# Patient Record
Sex: FEMALE | Race: WHITE | NOT HISPANIC OR LATINO | URBAN - METROPOLITAN AREA
[De-identification: names, ages, dates, MRNs, and addresses within clinical notes are randomized per-mention and may not be internally consistent; named-entity substitution may affect disease eponyms.]

---

## 2017-04-26 NOTE — PATIENT DISCUSSION
Schedule Surgery Counseling: I have discussed the option of scheduling surgery versus following, as well as the risks, benefits and alternatives of cataract surgery with the patient. It was explained that the surgery is medically indicated at this time, and it can be performed at the patient's option as delaying will cause no further deterioration, therefore there is no rush and there is no harm in waiting to have surgery. It was also explained that there is no guarantee that removing the cataract will improve their vision. The patient understands and desires to schedule preop followed by cataract surgery.

## 2017-04-26 NOTE — PATIENT DISCUSSION
Surgery Counseling: I have discussed the option of scheduling surgery versus following, as well as the risks, benefits and alternatives of cataract surgery with the patient. It was explained that the surgery is medically indicated at this time, and it can be performed at the patient's option as delaying will cause no further deterioration, therefore there is no rush and there is no harm in waiting to have surgery. It was also explained that there is no guarantee that removing the cataract will improve their vision. The patient understands and desires to proceed with cataract surgery with the implantation of an intraocular lens to improve vision for __DRIVING____. I have given the patient the prescribed regimen of the all-in-one drop to use before and after cataract surgery. They have elected to use the all-in-one option of Pred/Gati/Nepaf(prednisolone acetate,gatifloxacin,and nepafenac. Patient to administer as directed.

## 2017-05-02 NOTE — PATIENT DISCUSSION
CATARACTS, OU - VISUALLY SIGNIFICANT. SCHEDULE OD__ FIRST THEN LATER IN _OS_ DISCUSSED OPTION OF _STD WITH MANUAL LRI _______________VS _____STD_____________. PATIENT UNDERSTANDS AND DESIRES ______________STD DUE TO COST_______________.

## 2017-05-02 NOTE — PATIENT DISCUSSION
Surgery Counseling: I have discussed the option of scheduling surgery versus following, as well as the risks, benefits and alternatives of cataract surgery with the patient. It was explained that the surgery is medically indicated at this time, and it can be performed at the patient's option as delaying will cause no further deterioration, therefore there is no rush and there is no harm in waiting to have surgery. It was also explained that there is no guarantee that removing the cataract will improve their vision. The patient understands and desires to proceed with cataract surgery with the implantation of an intraocular lens to improve vision for driving at night andf glare__. I have given the patient the prescribed regimen of the all-in-one drop to use before and after cataract surgery. They have elected to use the all-in-one option of Pred/Gati/Nepaf(prednisolone acetate,gatifloxacin,and nepafenac. Patient to administer as directed.

## 2017-06-02 NOTE — PATIENT DISCUSSION
Post-Op Instructions: The patient was instructed in the proper use of post-operative eye drops: Ofloxacin in the surgical eye 4 times per day for 1 week, then discontinue . Continue Prolensa(or approved generic alternative Bromfenac 0.09%) once daily in the surgical eye and  Prednisolone 4 times per day until instructed further on next visit. Call back instructions, retinal detachment and endophthalmitis precautions given.

## 2017-06-02 NOTE — PATIENT DISCUSSION
Continue: Pred Forte (prednisolone acetate): drops,suspension: 1% 1 drop four times a day as directed into right eye 05-

## 2017-07-17 NOTE — PATIENT DISCUSSION
ACUTE IRITIS,  OS: PRESCRIBE PRED Q2 H FOR ONE DAY THEN QID THEREAFTER. 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 FOLLOW-UP AS SCHEDULED.

## 2017-07-17 NOTE — PATIENT DISCUSSION
Acute Iritis Counseling: I have explained the diagnosis and pathophysiology acute iritis. In isolated circumstances this is usually idiopathic, however recurrent intraocular inflammation may be associated with systemic disease and medical and laboratory workup may be necessary in conjunction with the patient&rsquo;s primary care doctor. I have stressed compliance with drops and follow-up.

## 2017-07-19 NOTE — PATIENT DISCUSSION
ACUTE IRITIS,  OS, IMPROVING: TAPER PRED QID X 4 DAYS, TID THEREAFTER. 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 FOLLOW-UP AS SCHEDULED.

## 2017-07-19 NOTE — PATIENT DISCUSSION
Continue: Pred Forte (prednisolone acetate): drops,suspension: 1% 1 drop four times a day into left eye 07-

## 2017-07-24 NOTE — PATIENT DISCUSSION
ACUTE IRITIS, OS, IMPROVING: TAPER PRED TID X 3 DAYS, BID X 3 DAYS, QD X 2 DAYS. 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 FOLLOW-UP AS SCHEDULED.

## 2018-08-14 ENCOUNTER — ESTABLISHED COMPREHENSIVE EXAM (OUTPATIENT)
Dept: URBAN - METROPOLITAN AREA CLINIC 39 | Facility: CLINIC | Age: 58
End: 2018-08-14

## 2018-08-14 DIAGNOSIS — H25.093: ICD-10-CM

## 2018-08-14 DIAGNOSIS — H04.123: ICD-10-CM

## 2018-08-14 PROCEDURE — 92015 DETERMINE REFRACTIVE STATE: CPT

## 2018-08-14 PROCEDURE — 92014 COMPRE OPH EXAM EST PT 1/>: CPT

## 2018-08-14 ASSESSMENT — TONOMETRY
OD_IOP_MMHG: 15
OS_IOP_MMHG: 15

## 2018-08-14 ASSESSMENT — VISUAL ACUITY
OS_SC: J2
OD_SC: 20/25-1
OS_SC: 20/40+2
OD_SC: J10

## 2020-07-16 ENCOUNTER — EST. PATIENT EMERGENCY (OUTPATIENT)
Dept: URBAN - METROPOLITAN AREA CLINIC 39 | Facility: CLINIC | Age: 60
End: 2020-07-16

## 2020-07-16 DIAGNOSIS — H04.123: ICD-10-CM

## 2020-07-16 DIAGNOSIS — H25.093: ICD-10-CM

## 2020-07-16 DIAGNOSIS — G43.B0: ICD-10-CM

## 2020-07-16 PROCEDURE — 92012 INTRM OPH EXAM EST PATIENT: CPT

## 2020-07-16 ASSESSMENT — VISUAL ACUITY
OS_SC: 20/30-1
OD_SC: 20/25+2

## 2020-09-14 NOTE — PATIENT DISCUSSION
CONJUNCTIVITIS (ALLERGIC), OU:  PRESCRIBE ZADITOR BID OU. RETURN FOR FOLLOW-UP AS SCHEDULED. Routing to provider for review.    Zully Echeverria, RN, BSN, PHN  [facility]

## 2020-10-14 ENCOUNTER — ESTABLISHED COMPREHENSIVE EXAM (OUTPATIENT)
Dept: URBAN - METROPOLITAN AREA CLINIC 39 | Facility: CLINIC | Age: 60
End: 2020-10-14

## 2020-10-14 DIAGNOSIS — H04.123: ICD-10-CM

## 2020-10-14 DIAGNOSIS — H25.093: ICD-10-CM

## 2020-10-14 DIAGNOSIS — G43.B0: ICD-10-CM

## 2020-10-14 PROCEDURE — 92014 COMPRE OPH EXAM EST PT 1/>: CPT

## 2020-10-14 PROCEDURE — 92015 DETERMINE REFRACTIVE STATE: CPT

## 2020-10-14 ASSESSMENT — KERATOMETRY
OD_K2POWER_DIOPTERS: 44.25
OS_K1POWER_DIOPTERS: 43.25
OD_AXISANGLE2_DEGREES: 105
OD_AXISANGLE_DEGREES: 15
OS_AXISANGLE2_DEGREES: 100
OD_K1POWER_DIOPTERS: 43.5
OS_K2POWER_DIOPTERS: 44
OS_AXISANGLE_DEGREES: 10

## 2020-10-14 ASSESSMENT — VISUAL ACUITY
OU_SC: J3
OS_SC: J3
OU_CC: J1+
OD_CC: J1
OD_SC: 20/25
OS_SC: 20/20
OD_SC: J5-
OU_SC: 20/20
OS_CC: J1+

## 2020-10-14 ASSESSMENT — TONOMETRY
OD_IOP_MMHG: 15
OS_IOP_MMHG: 13

## 2021-03-12 ENCOUNTER — EST. PATIENT EMERGENCY (OUTPATIENT)
Dept: URBAN - METROPOLITAN AREA CLINIC 39 | Facility: CLINIC | Age: 61
End: 2021-03-12

## 2021-03-12 DIAGNOSIS — H04.123: ICD-10-CM

## 2021-03-12 DIAGNOSIS — H43.812: ICD-10-CM

## 2021-03-12 DIAGNOSIS — H25.093: ICD-10-CM

## 2021-03-12 PROCEDURE — 92012 INTRM OPH EXAM EST PATIENT: CPT

## 2021-03-12 ASSESSMENT — VISUAL ACUITY
OS_SC: 20/25
OU_SC: 20/20-1
OD_SC: 20/25-1

## 2021-03-12 ASSESSMENT — TONOMETRY
OS_IOP_MMHG: 15
OD_IOP_MMHG: 14

## 2021-03-12 ASSESSMENT — KERATOMETRY
OD_AXISANGLE_DEGREES: 15
OD_K2POWER_DIOPTERS: 44.25
OS_K1POWER_DIOPTERS: 43.25
OD_AXISANGLE2_DEGREES: 105
OS_AXISANGLE2_DEGREES: 100
OS_K2POWER_DIOPTERS: 44
OD_K1POWER_DIOPTERS: 43.5
OS_AXISANGLE_DEGREES: 10

## 2021-04-16 ENCOUNTER — DILATED FUNDUS EXAM (OUTPATIENT)
Dept: URBAN - METROPOLITAN AREA CLINIC 39 | Facility: CLINIC | Age: 61
End: 2021-04-16

## 2021-04-16 DIAGNOSIS — H04.123: ICD-10-CM

## 2021-04-16 DIAGNOSIS — H43.812: ICD-10-CM

## 2021-04-16 DIAGNOSIS — H25.093: ICD-10-CM

## 2021-04-16 PROCEDURE — 92012 INTRM OPH EXAM EST PATIENT: CPT

## 2021-04-16 ASSESSMENT — KERATOMETRY
OD_K2POWER_DIOPTERS: 44.25
OS_AXISANGLE_DEGREES: 10
OD_AXISANGLE_DEGREES: 15
OD_K1POWER_DIOPTERS: 43.5
OS_AXISANGLE2_DEGREES: 100
OD_AXISANGLE2_DEGREES: 105
OS_K1POWER_DIOPTERS: 43.25
OS_K2POWER_DIOPTERS: 44

## 2021-04-16 ASSESSMENT — VISUAL ACUITY
OD_SC: 20/25-1+2
OS_SC: 20/25-2
OU_SC: 20/20-1

## 2021-04-16 ASSESSMENT — TONOMETRY
OS_IOP_MMHG: 17
OD_IOP_MMHG: 16

## 2022-10-19 ENCOUNTER — COMPREHENSIVE EXAM (OUTPATIENT)
Dept: URBAN - METROPOLITAN AREA CLINIC 39 | Facility: CLINIC | Age: 62
End: 2022-10-19

## 2022-10-19 DIAGNOSIS — H25.093: ICD-10-CM

## 2022-10-19 DIAGNOSIS — H52.4: ICD-10-CM

## 2022-10-19 DIAGNOSIS — H04.123: ICD-10-CM

## 2022-10-19 DIAGNOSIS — H43.812: ICD-10-CM

## 2022-10-19 PROCEDURE — 92014 COMPRE OPH EXAM EST PT 1/>: CPT

## 2022-10-19 PROCEDURE — 92015 DETERMINE REFRACTIVE STATE: CPT

## 2022-10-19 ASSESSMENT — VISUAL ACUITY
OD_BAT: 20/200
OD_CC: J1
OS_SC: J3
OD_SC: J6
OS_CC: J1+
OD_SC: 20/20
OS_BAT: <20/400
OS_SC: 20/25+1

## 2022-10-19 ASSESSMENT — KERATOMETRY
OS_AXISANGLE_DEGREES: 10
OD_K1POWER_DIOPTERS: 43.5
OD_AXISANGLE2_DEGREES: 105
OS_K1POWER_DIOPTERS: 43.25
OD_AXISANGLE_DEGREES: 15
OS_AXISANGLE2_DEGREES: 100
OD_K2POWER_DIOPTERS: 44.25
OS_K2POWER_DIOPTERS: 44

## 2022-10-19 ASSESSMENT — TONOMETRY
OD_IOP_MMHG: 16
OS_IOP_MMHG: 15

## 2023-07-31 ENCOUNTER — EMERGENCY VISIT (OUTPATIENT)
Dept: URBAN - METROPOLITAN AREA CLINIC 40 | Facility: CLINIC | Age: 63
End: 2023-07-31

## 2023-07-31 DIAGNOSIS — G43.B0: ICD-10-CM

## 2023-07-31 PROCEDURE — 92014 COMPRE OPH EXAM EST PT 1/>: CPT

## 2023-07-31 ASSESSMENT — KERATOMETRY
OD_K2POWER_DIOPTERS: 44.25
OD_AXISANGLE_DEGREES: 15
OS_AXISANGLE_DEGREES: 10
OS_AXISANGLE2_DEGREES: 100
OS_K2POWER_DIOPTERS: 44
OS_K1POWER_DIOPTERS: 43.25
OD_AXISANGLE2_DEGREES: 105
OD_K1POWER_DIOPTERS: 43.5

## 2023-07-31 ASSESSMENT — TONOMETRY
OS_IOP_MMHG: 15
OD_IOP_MMHG: 15

## 2023-07-31 ASSESSMENT — VISUAL ACUITY
OD_SC: 20/20-2
OU_SC: 20/20-1
OS_SC: 20/25-1

## 2024-01-29 ENCOUNTER — COMPREHENSIVE EXAM (OUTPATIENT)
Dept: URBAN - METROPOLITAN AREA CLINIC 40 | Facility: CLINIC | Age: 64
End: 2024-01-29

## 2024-01-29 DIAGNOSIS — H25.093: ICD-10-CM

## 2024-01-29 DIAGNOSIS — G43.B0: ICD-10-CM

## 2024-01-29 DIAGNOSIS — H43.812: ICD-10-CM

## 2024-01-29 DIAGNOSIS — H52.4: ICD-10-CM

## 2024-01-29 DIAGNOSIS — H04.123: ICD-10-CM

## 2024-01-29 PROCEDURE — 92015 DETERMINE REFRACTIVE STATE: CPT

## 2024-01-29 PROCEDURE — 92014 COMPRE OPH EXAM EST PT 1/>: CPT

## 2024-01-29 ASSESSMENT — KERATOMETRY
OS_AXISANGLE_DEGREES: 10
OS_AXISANGLE2_DEGREES: 100
OD_K1POWER_DIOPTERS: 43.5
OS_K2POWER_DIOPTERS: 44
OD_AXISANGLE2_DEGREES: 105
OS_K1POWER_DIOPTERS: 43.25
OD_AXISANGLE_DEGREES: 15
OD_K2POWER_DIOPTERS: 44.25

## 2024-01-29 ASSESSMENT — VISUAL ACUITY
OU_SC: J3
OS_SC: J3
OS_CC: J1
OD_CC: J1+
OD_SC: 20/25
OD_SC: J3
OU_CC: J1+
OU_SC: 20/25
OS_SC: 20/25

## 2024-01-29 ASSESSMENT — TONOMETRY
OS_IOP_MMHG: 12
OD_IOP_MMHG: 12

## 2024-11-24 ENCOUNTER — EMERGENCY VISIT (OUTPATIENT)
Dept: URBAN - METROPOLITAN AREA CLINIC 35 | Facility: CLINIC | Age: 64
End: 2024-11-24

## 2024-11-24 DIAGNOSIS — H35.413: ICD-10-CM

## 2024-11-24 DIAGNOSIS — H43.813: ICD-10-CM

## 2024-11-24 PROCEDURE — 92014 COMPRE OPH EXAM EST PT 1/>: CPT

## 2024-12-23 ENCOUNTER — FOLLOW UP (OUTPATIENT)
Age: 64
End: 2024-12-23

## 2024-12-23 DIAGNOSIS — H43.813: ICD-10-CM

## 2024-12-23 PROCEDURE — 92014 COMPRE OPH EXAM EST PT 1/>: CPT

## 2025-02-03 ENCOUNTER — COMPREHENSIVE EXAM (OUTPATIENT)
Age: 65
End: 2025-02-03

## 2025-02-03 DIAGNOSIS — H43.813: ICD-10-CM

## 2025-02-03 DIAGNOSIS — H04.123: ICD-10-CM

## 2025-02-03 DIAGNOSIS — H25.093: ICD-10-CM

## 2025-02-03 DIAGNOSIS — H35.413: ICD-10-CM

## 2025-02-03 DIAGNOSIS — G43.B0: ICD-10-CM

## 2025-02-03 PROCEDURE — 92014 COMPRE OPH EXAM EST PT 1/>: CPT

## 2025-02-03 PROCEDURE — 92015 DETERMINE REFRACTIVE STATE: CPT
